# Patient Record
Sex: FEMALE | URBAN - METROPOLITAN AREA
[De-identification: names, ages, dates, MRNs, and addresses within clinical notes are randomized per-mention and may not be internally consistent; named-entity substitution may affect disease eponyms.]

---

## 2020-10-06 ENCOUNTER — CLAIMS CREATED FROM THE CLAIM WINDOW (OUTPATIENT)
Dept: URBAN - METROPOLITAN AREA CLINIC 118 | Facility: CLINIC | Age: 60
End: 2020-10-06
Payer: COMMERCIAL

## 2020-10-06 ENCOUNTER — DASHBOARD ENCOUNTERS (OUTPATIENT)
Age: 60
End: 2020-10-06

## 2020-10-06 DIAGNOSIS — R10.13 EPIGASTRIC ABDOMINAL PAIN: ICD-10-CM

## 2020-10-06 PROBLEM — 79922009: Status: ACTIVE | Noted: 2020-10-06

## 2020-10-06 PROCEDURE — 99244 OFF/OP CNSLTJ NEW/EST MOD 40: CPT | Performed by: INTERNAL MEDICINE

## 2020-10-06 PROCEDURE — G8483 FLU IMM NO ADMIN DOC REA: HCPCS | Performed by: INTERNAL MEDICINE

## 2020-10-06 PROCEDURE — 99204 OFFICE O/P NEW MOD 45 MIN: CPT | Performed by: INTERNAL MEDICINE

## 2020-10-06 NOTE — HPI-TODAY'S VISIT:
59 yo BF here for follow up of a 2 wk hx of epigastric stabbing pain, worse with lying down, with mild nausea, in 8/2020.  Worse with po intake as well.  Seemed to radiate bilaterally.  Resolved with carafate and abx.  Of note, pt states symptoms similar to when she had gallbladder disease.  BMs usu reg, qd basis, no change, no GI bleed.  Losing weight voluntarily.  No F/C/NS during pain. No ASA/NSAID history.